# Patient Record
Sex: FEMALE | Race: WHITE | Employment: PART TIME | URBAN - METROPOLITAN AREA
[De-identification: names, ages, dates, MRNs, and addresses within clinical notes are randomized per-mention and may not be internally consistent; named-entity substitution may affect disease eponyms.]

---

## 2018-11-07 LAB
CHLAMYDIA, EXTERNAL: NEGATIVE
HBSAG, EXTERNAL: NEGATIVE
HIV, EXTERNAL: NEGATIVE
N. GONORRHEA, EXTERNAL: NEGATIVE
RPR, EXTERNAL: NON REACTIVE
RUBELLA, EXTERNAL: NORMAL

## 2019-05-14 ENCOUNTER — HOSPITAL ENCOUNTER (OUTPATIENT)
Dept: INFUSION THERAPY | Age: 39
End: 2019-05-14

## 2019-05-16 ENCOUNTER — HOSPITAL ENCOUNTER (OUTPATIENT)
Dept: INFUSION THERAPY | Age: 39
Discharge: HOME | End: 2019-05-16
Payer: COMMERCIAL

## 2019-05-16 VITALS
RESPIRATION RATE: 16 BRPM | HEART RATE: 98 BPM | OXYGEN SATURATION: 99 % | SYSTOLIC BLOOD PRESSURE: 104 MMHG | TEMPERATURE: 98.2 F | DIASTOLIC BLOOD PRESSURE: 68 MMHG

## 2019-05-16 LAB
ABO + RH BLD: NORMAL
BASOPHILS # BLD: 0 K/UL (ref 0–0.1)
BASOPHILS NFR BLD: 1 % (ref 0–2)
BLOOD GROUP ANTIBODIES SERPL: NORMAL
DIFFERENTIAL METHOD BLD: ABNORMAL
EOSINOPHIL # BLD: 0.1 K/UL (ref 0–0.4)
EOSINOPHIL NFR BLD: 1 % (ref 0–5)
ERYTHROCYTE [DISTWIDTH] IN BLOOD BY AUTOMATED COUNT: 14.3 % (ref 11.6–14.5)
GLUCOSE 1H P MEAL SERPL-MCNC: 91 MG/DL (ref 74–106)
HCT VFR BLD AUTO: 29.3 % (ref 35–45)
HGB BLD-MCNC: 9.5 G/DL (ref 12–16)
LYMPHOCYTES # BLD: 1.3 K/UL (ref 0.9–3.6)
LYMPHOCYTES NFR BLD: 20 % (ref 21–52)
MCH RBC QN AUTO: 31.1 PG (ref 24–34)
MCHC RBC AUTO-ENTMCNC: 32.4 G/DL (ref 31–37)
MCV RBC AUTO: 96.1 FL (ref 74–97)
MONOCYTES # BLD: 0.7 K/UL (ref 0.05–1.2)
MONOCYTES NFR BLD: 11 % (ref 3–10)
NEUTS SEG # BLD: 4.3 K/UL (ref 1.8–8)
NEUTS SEG NFR BLD: 67 % (ref 40–73)
PLATELET # BLD AUTO: 176 K/UL (ref 135–420)
PMV BLD AUTO: 9.2 FL (ref 9.2–11.8)
RBC # BLD AUTO: 3.05 M/UL (ref 4.2–5.3)
SPECIMEN EXP DATE BLD: NORMAL
T PALLIDUM AB SER QL IA: NONREACTIVE
WBC # BLD AUTO: 6.3 K/UL (ref 4.6–13.2)

## 2019-05-16 PROCEDURE — 96372 THER/PROPH/DIAG INJ SC/IM: CPT

## 2019-05-16 PROCEDURE — 36415 COLL VENOUS BLD VENIPUNCTURE: CPT

## 2019-05-16 PROCEDURE — 86780 TREPONEMA PALLIDUM: CPT

## 2019-05-16 PROCEDURE — 86900 BLOOD TYPING SEROLOGIC ABO: CPT

## 2019-05-16 PROCEDURE — 82306 VITAMIN D 25 HYDROXY: CPT

## 2019-05-16 PROCEDURE — 85025 COMPLETE CBC W/AUTO DIFF WBC: CPT

## 2019-05-16 PROCEDURE — 82950 GLUCOSE TEST: CPT

## 2019-05-16 PROCEDURE — 74011250636 HC RX REV CODE- 250/636: Performed by: OBSTETRICS & GYNECOLOGY

## 2019-05-16 RX ORDER — DIPHENHYDRAMINE HCL 25 MG
25 CAPSULE ORAL
COMMUNITY

## 2019-05-16 RX ADMIN — HUMAN RHO(D) IMMUNE GLOBULIN 0.3 MG: 300 INJECTION, SOLUTION INTRAMUSCULAR at 10:57

## 2019-05-16 NOTE — PROGRESS NOTES
[facility] Progress Note Date: May 16, 2019 Name: Rosas Sanchez MRN: [id number] : 1980 Ms. Crhis Mauricio arrived to [facility] at [phone] for rhogam injection. Pt states that she has \" had at least 3 in the past\" and denies any reactions or complications from previous injections. Pt also kindly declines care notes. Ms. Chris Mauricio was assessed and education was provided. Consent form signed no questions voiced. Ms. Katie Calabrese vitals were reviewed. Visit Vitals /68 (BP 1 Location: Left arm) Pulse 98 Temp 98.2 °F (36.8 °C) Resp 16 SpO2 99% Breastfeeding? No  
 
 
Recent Results (from the past 12 hour(s)) TYPE & SCREEN Collection Time: 19  9:15 AM  
Result Value Ref Range Crossmatch Expiration 2019 ABO/Rh(D) O NEGATIVE Antibody screen NEG   
RH IMMUNE GLOBULIN PROPHYLACTIC Collection Time: 19  9:15 AM  
Result Value Ref Range No. of weeks gestation 32 CALLED TO: EVERETTE WASHINGTON ON 2019 1044 BY [facility] Unit number [id number] Blood component type RH IMMUNE GLOBULIN Unit division 00 Status of unit ISSUED   
CBC WITH AUTOMATED DIFF Collection Time: 19  9:20 AM  
Result Value Ref Range WBC 6.3 4.6 - 13.2 K/uL  
 RBC 3.05 (L) 4.20 - 5.30 M/uL HGB 9.5 (L) 12.0 - 16.0 g/dL HCT 29.3 (L) 35.0 - 45.0 % MCV 96.1 74.0 - 97.0 FL  
 MCH 31.1 24.0 - 34.0 PG  
 MCHC 32.4 31.0 - 37.0 g/dL  
 RDW 14.3 11.6 - 14.5 % PLATELET 258 410 - 577 K/uL MPV 9.2 9.2 - 11.8 FL  
 NEUTROPHILS 67 40 - 73 % LYMPHOCYTES 20 (L) 21 - 52 % MONOCYTES 11 (H) 3 - 10 % EOSINOPHILS 1 0 - 5 % BASOPHILS 1 0 - 2 %  
 ABS. NEUTROPHILS 4.3 1.8 - 8.0 K/UL  
 ABS. LYMPHOCYTES 1.3 0.9 - 3.6 K/UL  
 ABS. MONOCYTES 0.7 0.05 - 1.2 K/UL  
 ABS. EOSINOPHILS 0.1 0.0 - 0.4 K/UL  
 ABS. BASOPHILS 0.0 0.0 - 0.1 K/UL  
 DF AUTOMATED    
GLUCOSE, 1 HR PP Collection Time: 19 10:20 AM  
Result Value Ref Range Glucose, 1 hr PP 91 74 - 106 MG/DL  
 
 
 
 
300 mcg Rhogam  was administered as ordered IM in patient's Right deltoid after verification from 2nd nurse, covered with bandaid Ms. Garcia tolerated well without complaints. Ms. Ramona aFll was discharged from [place] [address] in stable condition at 1135  She is to return only as referred. Massimo Galloway RN May 16, 2019

## 2019-05-17 LAB
BLD PROD TYP BPU: NORMAL
BPU ID: NORMAL
CALLED TO:,BCALL1: NORMAL
GA (WEEKS): 27 WK
STATUS OF UNIT,%ST: NORMAL
UNIT DIVISION, %UDIV: 0

## 2019-05-23 LAB
25(OH)D2 SERPL-MCNC: <1 NG/ML
25(OH)D3 SERPL-MCNC: 36 NG/ML
25(OH)D3+25(OH)D2 SERPL-MCNC: 36 NG/ML

## 2019-08-02 NOTE — H&P
[facility]  HISTORY AND PHYSICAL    Name:  Lucas Banks  MR#:   [id number]  :  1980  ACCOUNT #:  [de-identified]  ADMIT DATE:  2019    ADMISSION DIAGNOSES:  Advanced maternal age, term pregnancy at 44 plus 3 weeks' gestation, transverse lie, for repeat  section. HISTORY OF PRESENT ILLNESS:  The patient is a 49-year-old  3, para 2, aborta 0  female. Workup for advanced maternal age revealed no abnormality seen except there were two fibroids noted on the ultrasound and an anterior fundal placenta. The baby was in the 80 percentile. The patient is now transverse lie, back is neutral as the ultrasound on 2019. The patient is currently in for her third  section. The patient will be on Ancef prophylaxis. The patient did have some issues which did prompt a PIH panel with a 24-hour urine revealing 485 mg per 24 hours with her studies having just completed this past week. There is no proteinuria today and blood pressure is running 127/79. PIH counts were also all normal at that time. Currently, the patient is having some generalized headache, primarily temporal right and then all over, although it is not a sinus headache or what appears to be toxemia. The patient notably had a concussion which was worked up in 2019 by Neurology and was felt to be benign and no MRI was done. The headache is somewhat short acting. It does not seem to be positional.    PAST MEDICAL HISTORY:  Previous history in  and  of . The patient has had a benign arrhythmia workup with EKG during this pregnancy. EKG was otherwise normal.  The patient also has had clearance of her Pap smear which is now class I.  Had a benign breast mass in . ALLERGIES:  NO KNOWN DRUG ALLERGIES. SOCIAL HISTORY:  Spouse's name is Ty. FAMILY HISTORY:  The patient's mother did have colon cancer. The patient also had a grandmother with colon cancer.     The patient's alpha-fetoprotein 4 and cystic fibrosis testing were both normal at 15 weeks. Thyroid function tests were all normal.  The patient's one-hour postprandial blood sugars were all normal at 30 weeks. The patient's hemoglobin was slightly low at 9.5 at or around 36 weeks. The patient's blood type is O negative. She did receive prophylactic RhoGAM on 05/16/2019. Pap smear is class I.  RPR nonreactive. GC and CT cultures are negative. Rubella immune. Hepatitis and AIDS screens are negative. Vitamin D level was listed as 33 during the pregnancy. REVIEW OF SYSTEMS:  System review is otherwise noncontributory and/or normal except for given above. PHYSICAL EXAMINATION:  GENERAL:  A well-developed, well-nourished  female. VITAL SIGNS:  5 feet 4 inches tall. Weight 157 pounds. Blood pressure 127/79. HEAD, EYES, EARS, NOSE, AND THROAT:  Normal.  CHEST:  Clear. BREASTS:  Without extraneous masses. CARDIAC:  Normal.  ABDOMEN:  Reveals an estimated fetal weight about 8 to 8-1/2 pounds. Transverse lie by ultrasound. Vertex near the left upper quadrant and has an anterior grade II placenta which does come down with the upper two-thirds of the anterior fundus. Amniotic fluid index is approximately 8.   Rest of the exam including neurologic is otherwise normal.  Reflexes are normal.  Duo doppler us legs, 8/5/19 = wnl no evidence dvt, mi er. 5 pm    Mary Self MD      [id number]  D:  07/30/2019 13:29  T:  07/30/2019 15:24  JOB #:  [id number]  CC:  Labor And  Delivery

## 2019-08-05 ENCOUNTER — APPOINTMENT (OUTPATIENT)
Dept: VASCULAR SURGERY | Age: 39
End: 2019-08-05
Attending: EMERGENCY MEDICINE
Payer: COMMERCIAL

## 2019-08-05 ENCOUNTER — HOSPITAL ENCOUNTER (EMERGENCY)
Age: 39
Discharge: HOME | End: 2019-08-05
Attending: EMERGENCY MEDICINE
Payer: COMMERCIAL

## 2019-08-05 ENCOUNTER — HOSPITAL ENCOUNTER (OUTPATIENT)
Dept: PREADMISSION TESTING | Age: 39
Discharge: HOME | End: 2019-08-05
Payer: COMMERCIAL

## 2019-08-05 VITALS
TEMPERATURE: 98.1 F | SYSTOLIC BLOOD PRESSURE: 119 MMHG | HEIGHT: 65 IN | WEIGHT: 160 LBS | DIASTOLIC BLOOD PRESSURE: 83 MMHG | RESPIRATION RATE: 18 BRPM | BODY MASS INDEX: 26.66 KG/M2 | HEART RATE: 93 BPM | OXYGEN SATURATION: 100 %

## 2019-08-05 DIAGNOSIS — M25.562 ARTHRALGIA OF LEFT LOWER LEG: Primary | ICD-10-CM

## 2019-08-05 LAB
ABO + RH BLD: NORMAL
ALBUMIN SERPL-MCNC: 2.9 G/DL (ref 3.4–5)
ALBUMIN/GLOB SERPL: 0.9 {RATIO} (ref 0.8–1.7)
ALP SERPL-CCNC: 149 U/L (ref 45–117)
ALT SERPL-CCNC: 19 U/L (ref 13–56)
ANION GAP SERPL CALC-SCNC: 8 MMOL/L (ref 3–18)
APPEARANCE UR: CLEAR
AST SERPL-CCNC: 19 U/L (ref 10–38)
BILIRUB SERPL-MCNC: 0.5 MG/DL (ref 0.2–1)
BILIRUB UR QL: NEGATIVE
BLOOD GROUP ANTIBODIES SERPL: NORMAL
BLOOD GROUP ANTIBODIES SERPL: NORMAL
BUN SERPL-MCNC: 7 MG/DL (ref 7–18)
BUN/CREAT SERPL: 14 (ref 12–20)
CALCIUM SERPL-MCNC: 8.8 MG/DL (ref 8.5–10.1)
CHLORIDE SERPL-SCNC: 110 MMOL/L (ref 100–111)
CO2 SERPL-SCNC: 25 MMOL/L (ref 21–32)
COLOR UR: YELLOW
CREAT SERPL-MCNC: 0.5 MG/DL (ref 0.6–1.3)
ERYTHROCYTE [DISTWIDTH] IN BLOOD BY AUTOMATED COUNT: 13.8 % (ref 11.6–14.5)
GLOBULIN SER CALC-MCNC: 3.3 G/DL (ref 2–4)
GLUCOSE SERPL-MCNC: 79 MG/DL (ref 74–99)
GLUCOSE UR STRIP.AUTO-MCNC: NEGATIVE MG/DL
HCT VFR BLD AUTO: 36.7 % (ref 35–45)
HGB BLD-MCNC: 11.9 G/DL (ref 12–16)
HGB UR QL STRIP: NEGATIVE
KETONES UR QL STRIP.AUTO: NEGATIVE MG/DL
LEUKOCYTE ESTERASE UR QL STRIP.AUTO: NEGATIVE
MCH RBC QN AUTO: 31.6 PG (ref 24–34)
MCHC RBC AUTO-ENTMCNC: 32.4 G/DL (ref 31–37)
MCV RBC AUTO: 97.6 FL (ref 74–97)
NITRITE UR QL STRIP.AUTO: NEGATIVE
PH UR STRIP: 8 [PH] (ref 5–8)
PLATELET # BLD AUTO: 149 K/UL (ref 135–420)
PMV BLD AUTO: 10.8 FL (ref 9.2–11.8)
POTASSIUM SERPL-SCNC: 3.9 MMOL/L (ref 3.5–5.5)
PROT SERPL-MCNC: 6.2 G/DL (ref 6.4–8.2)
PROT UR STRIP-MCNC: NEGATIVE MG/DL
RBC # BLD AUTO: 3.76 M/UL (ref 4.2–5.3)
RPR SER QL: NONREACTIVE
SODIUM SERPL-SCNC: 143 MMOL/L (ref 136–145)
SP GR UR REFRACTOMETRY: 1.01 (ref 1–1.03)
SPECIMEN EXP DATE BLD: NORMAL
UROBILINOGEN UR QL STRIP.AUTO: 0.2 EU/DL (ref 0.2–1)
WBC # BLD AUTO: 5.6 K/UL (ref 4.6–13.2)

## 2019-08-05 PROCEDURE — 99283 EMERGENCY DEPT VISIT LOW MDM: CPT

## 2019-08-05 PROCEDURE — 93971 EXTREMITY STUDY: CPT

## 2019-08-05 PROCEDURE — 81003 URINALYSIS AUTO W/O SCOPE: CPT

## 2019-08-05 PROCEDURE — 86900 BLOOD TYPING SEROLOGIC ABO: CPT

## 2019-08-05 PROCEDURE — 80053 COMPREHEN METABOLIC PANEL: CPT

## 2019-08-05 PROCEDURE — 86870 RBC ANTIBODY IDENTIFICATION: CPT

## 2019-08-05 PROCEDURE — 86592 SYPHILIS TEST NON-TREP QUAL: CPT

## 2019-08-05 PROCEDURE — 36415 COLL VENOUS BLD VENIPUNCTURE: CPT

## 2019-08-05 PROCEDURE — 85027 COMPLETE CBC AUTOMATED: CPT

## 2019-08-05 NOTE — ED PROVIDER NOTES
EMERGENCY DEPARTMENT HISTORY AND PHYSICAL EXAM    Date: 2019  Patient Name: Ramana Paredes    History of Presenting Illness     Chief Complaint   Patient presents with    Leg Pain         History Provided By: Patient    Chief Complaint: leg pain       Additional History (Context):   6:49 PM  Ramana Paredes is a 45 y.o. female 44 weeks pregnant presents to the emergency department C/O left leg pain x a week. No injury to the area. Patient denies any chest pain or shortness of breath. She is scheduled for  next week. Sent by Dr. Nereyda Arellano to rule out DVT     PCP: Catia Leon MD    Current Outpatient Medications   Medication Sig Dispense Refill    diphenhydrAMINE (BENADRYL) 25 mg capsule Take 25 mg by mouth nightly as needed.  prenatal vit-fe fum-fa-dss (PRENATAL 19) 29-1 mg Tab Take 1 Tab by mouth daily. Past History     Past Medical History:  Past Medical History:   Diagnosis Date    Unspecified breast disorder     breast biopsy in  for cyst, benign. Past Surgical History:  Past Surgical History:   Procedure Laterality Date     DELIVERY ONLY             Family History:  History reviewed. No pertinent family history. Social History:  Social History     Tobacco Use    Smoking status: Never Smoker    Smokeless tobacco: Never Used   Substance Use Topics    Alcohol use: No    Drug use: No       Allergies:  No Known Allergies    Review of Systems   Review of Systems   Constitutional: Negative for chills and fever. Respiratory: Negative for shortness of breath. Cardiovascular: Negative for chest pain. Musculoskeletal: Positive for myalgias (left leg). Skin: Negative for color change, rash and wound. Neurological: Negative for weakness and numbness. All other systems reviewed and are negative.       Physical Exam     Vitals:    19 1832   BP: 125/70   Pulse: 93   Resp: 18   Temp: 98.8 °F (37.1 °C)   SpO2: 100%   Weight: 72.6 kg (160 lb) Height: 5' 5\" (1.651 m)     Physical Exam   Constitutional: She is oriented to person, place, and time. She appears well-developed and well-nourished. Alert well-appearing nontoxic no acute distress   HENT:   Head: Normocephalic and atraumatic. Neck: Normal range of motion. Neck supple. Cardiovascular: Normal rate, regular rhythm, normal heart sounds and intact distal pulses. No murmur heard. Pulmonary/Chest: Effort normal and breath sounds normal. No respiratory distress. She has no wheezes. She has no rales. Abdominal: Soft. Bowel sounds are normal. There is no tenderness. Gravid nontender abdomen   Musculoskeletal:        Legs:  Neurological: She is alert and oriented to person, place, and time. Psychiatric: She has a normal mood and affect. Judgment normal.   Nursing note and vitals reviewed. Diagnostic Study Results     Labs:     Recent Results (from the past 12 hour(s))   CBC W/O DIFF    Collection Time: 08/05/19 12:55 PM   Result Value Ref Range    WBC 5.6 4.6 - 13.2 K/uL    RBC 3.76 (L) 4.20 - 5.30 M/uL    HGB 11.9 (L) 12.0 - 16.0 g/dL    HCT 36.7 35.0 - 45.0 %    MCV 97.6 (H) 74.0 - 97.0 FL    MCH 31.6 24.0 - 34.0 PG    MCHC 32.4 31.0 - 37.0 g/dL    RDW 13.8 11.6 - 14.5 %    PLATELET 199 874 - 629 K/uL    MPV 10.8 9.2 - 70.6 FL   METABOLIC PANEL, COMPREHENSIVE    Collection Time: 08/05/19 12:55 PM   Result Value Ref Range    Sodium 143 136 - 145 mmol/L    Potassium 3.9 3.5 - 5.5 mmol/L    Chloride 110 100 - 111 mmol/L    CO2 25 21 - 32 mmol/L    Anion gap 8 3.0 - 18 mmol/L    Glucose 79 74 - 99 mg/dL    BUN 7 7.0 - 18 MG/DL    Creatinine 0.50 (L) 0.6 - 1.3 MG/DL    BUN/Creatinine ratio 14 12 - 20      GFR est AA >60 >60 ml/min/1.73m2    GFR est non-AA >60 >60 ml/min/1.73m2    Calcium 8.8 8.5 - 10.1 MG/DL    Bilirubin, total 0.5 0.2 - 1.0 MG/DL    ALT (SGPT) 19 13 - 56 U/L    AST (SGOT) 19 10 - 38 U/L    Alk.  phosphatase 149 (H) 45 - 117 U/L    Protein, total 6.2 (L) 6.4 - 8.2 g/dL Albumin 2.9 (L) 3.4 - 5.0 g/dL    Globulin 3.3 2.0 - 4.0 g/dL    A-G Ratio 0.9 0.8 - 1.7     RPR    Collection Time: 08/05/19 12:55 PM   Result Value Ref Range    RPR NONREACTIVE NR     URINALYSIS W/ RFLX MICROSCOPIC    Collection Time: 08/05/19 12:55 PM   Result Value Ref Range    Color YELLOW      Appearance CLEAR      Specific gravity 1.008 1.005 - 1.030      pH (UA) 8.0 5.0 - 8.0      Protein NEGATIVE  NEG mg/dL    Glucose NEGATIVE  NEG mg/dL    Ketone NEGATIVE  NEG mg/dL    Bilirubin NEGATIVE  NEG      Blood NEGATIVE  NEG      Urobilinogen 0.2 0.2 - 1.0 EU/dL    Nitrites NEGATIVE  NEG      Leukocyte Esterase NEGATIVE  NEG     TYPE & SCREEN    Collection Time: 08/05/19 12:55 PM   Result Value Ref Range    Crossmatch Expiration 08/08/2019     ABO/Rh(D) O NEGATIVE     Antibody screen POS     Antibody ID anti-D, recent RHIG injection        Radiologic Studies:   No orders to display     CT Results  (Last 48 hours)    None        CXR Results  (Last 48 hours)    None          PVL LLE:   · No evidence of deep vein thrombosis in the left lower extremity veins assessed and contralateral common femoral vein. As read by Cloud [employer]    Medical Decision Making   I am the first provider for this patient. I reviewed the vital signs, available nursing notes, past medical history, past surgical history, family history and social history. Vital Signs: Reviewed the patient's vital signs. Pulse Oximetry Analysis: 100% on RA       Records Reviewed: Nursing Notes and Old Medical Records    Procedures:  Procedures    ED Course:   6:49 PM Initial assessment performed. The patients presenting problems have been discussed, and they are in agreement with the care plan formulated and outlined with them. I have encouraged them to ask questions as they arise throughout their visit. Discussion:  Pt presents with left lower leg pain sent by her OB/GYN to rule out DVT. PVL study negative for DVT.   No evidence of cellulitis. Will have patient continue to take Tylenol for pain. Strict return precautions given, pt offering no questions or complaints. Diagnosis and Disposition     DISCHARGE NOTE:  Alexandria Garcia's  results have been reviewed with her. She has been counseled regarding her diagnosis, treatment, and plan. She verbally conveys understanding and agreement of the signs, symptoms, diagnosis, treatment and prognosis and additionally agrees to follow up as discussed. She also agrees with the care-plan and conveys that all of her questions have been answered. I have also provided discharge instructions for her that include: educational information regarding their diagnosis and treatment, and list of reasons why they would want to return to the ED prior to their follow-up appointment, should her condition change. She has been provided with education for proper emergency department utilization. CLINICAL IMPRESSION:    1. Arthralgia of left lower leg        PLAN:  1. D/C Home  2. Current Discharge Medication List        3. Follow-up Information     Follow up With Specialties Details Why William Cesar MD Obstetrics & Gynecology, Gynecology, Obstetrics  call for follow up and recheck  [facility]      [facility] EMERGENCY DEPT Emergency Medicine  If symptoms worsen [address] [place] [address] [phone]                 Please note that this dictation was completed with [employer], the computer voice recognition software. Quite often unanticipated grammatical, syntax, homophones, and other interpretive errors are inadvertently transcribed by the computer software. Please disregard these errors. Please excuse any errors that have escaped final proofreading.

## 2019-08-06 ENCOUNTER — ANESTHESIA EVENT (OUTPATIENT)
Dept: LABOR AND DELIVERY | Age: 39
End: 2019-08-06
Payer: COMMERCIAL

## 2019-08-06 PROBLEM — Z3A.39 39 WEEKS GESTATION OF PREGNANCY: Status: ACTIVE | Noted: 2019-08-06

## 2019-08-06 PROBLEM — O34.219 DELIVERY WITH HISTORY OF CESAREAN SECTION: Status: ACTIVE | Noted: 2019-08-06

## 2019-08-06 PROBLEM — O09.523 AMA (ADVANCED MATERNAL AGE) MULTIGRAVIDA 35+, THIRD TRIMESTER: Status: ACTIVE | Noted: 2019-08-06

## 2019-08-06 NOTE — ED NOTES
Discharge instructions and medications reviewed with pt, pt in stable condition, pt verbalizes understanding

## 2019-08-06 NOTE — DISCHARGE INSTRUCTIONS
Patient Education         Section: What to Expect at Home  Your Recovery    A  section, or , is surgery to deliver your baby through a cut, called an incision, that the doctor makes in your lower belly and uterus. You may have some pain in your lower belly and need pain medicine for 1 to 2 weeks. You can expect some vaginal bleeding for several weeks. You will probably need about 6 weeks to fully recover. It is important to take it easy while the incision is healing. Avoid heavy lifting, strenuous activities, or exercises that strain the belly muscles while you are recovering. Ask a family member or friend for help with housework, cooking, and shopping. This care sheet gives you a general idea about how long it will take for you to recover. But each person recovers at a different pace. Follow the steps below to get better as quickly as possible. How can you care for yourself at home? Activity    · Rest when you feel tired. Getting enough sleep will help you recover.     · Try to walk each day. Start by walking a little more than you did the day before. Bit by bit, increase the amount you walk. Walking boosts blood flow and helps prevent pneumonia, constipation, and blood clots.     · Avoid strenuous activities, such as bicycle riding, jogging, weightlifting, and aerobic exercise, for 6 weeks or until your doctor says it is okay.     · Until your doctor says it is okay, do not lift anything heavier than your baby.     · Do not do sit-ups or other exercises that strain the belly muscles for 6 weeks or until your doctor says it is okay.     · Hold a pillow over your incision when you cough or take deep breaths. This will support your belly and decrease your pain.     · You may shower as usual. Pat the incision dry when you are done.     · You will have some vaginal bleeding. Wear sanitary pads.  Do not douche or use tampons until your doctor says it is okay.     · Ask your doctor when you can drive again.     · You will probably need to take at least 6 weeks off work. It depends on the type of work you do and how you feel.     · Ask your doctor when it is okay for you to have sex. Diet    · You can eat your normal diet. If your stomach is upset, try bland, low-fat foods like plain rice, broiled chicken, toast, and yogurt.     · Drink plenty of fluids (unless your doctor tells you not to).     · You may notice that your bowel movements are not regular right after your surgery. This is common. Try to avoid constipation and straining with bowel movements. You may want to take a fiber supplement every day. If you have not had a bowel movement after a couple of days, ask your doctor about taking a mild laxative.     · If you are breastfeeding, limit alcohol. Alcohol can cause a lack of energy and other health problems for the baby when a breastfeeding woman drinks heavily. It can also get in the way of a mom's ability to feed her baby or to care for the child in other ways. There isn't a lot of research about exactly how much alcohol can harm a baby. Having no alcohol is the safest choice for your baby. If you choose to have a drink now and then, have only one drink, and limit the number of occasions that you have a drink. Wait to breastfeed at least 2 hours after you have a drink to reduce the amount of alcohol the baby may get in the milk. Medicines    · Your doctor will tell you if and when you can restart your medicines. He or she will also give you instructions about taking any new medicines.     · If you take blood thinners, such as warfarin (Coumadin), clopidogrel (Plavix), or aspirin, be sure to talk to your doctor. He or she will tell you if and when to start taking those medicines again. Make sure that you understand exactly what your doctor wants you to do.     · Take pain medicines exactly as directed. ? If the doctor gave you a prescription medicine for pain, take it as prescribed. ?  If you are not taking a prescription pain medicine, ask your doctor if you can take an over-the-counter medicine.     · If you think your pain medicine is making you sick to your stomach:  ? Take your medicine after meals (unless your doctor has told you not to). ? Ask your doctor for a different pain medicine.     · If your doctor prescribed antibiotics, take them as directed. Do not stop taking them just because you feel better. You need to take the full course of antibiotics. Incision care    · If you have strips of tape on the incision, leave the tape on for a week or until it falls off.     · Wash the area daily with warm, soapy water, and pat it dry. Don't use hydrogen peroxide or alcohol, which can slow healing. You may cover the area with a gauze bandage if it weeps or rubs against clothing. Change the bandage every day.     · Keep the area clean and dry. Other instructions    · If you breastfeed your baby, you may be more comfortable while you are healing if you place the baby so that he or she is not resting on your belly. Try tucking your baby under your arm, with his or her body along the side you will be feeding on. Support your baby's upper body with your arm. With that hand you can control your baby's head to bring his or her mouth to your breast. This is sometimes called the football hold. Follow-up care is a key part of your treatment and safety. Be sure to make and go to all appointments, and call your doctor if you are having problems. It's also a good idea to know your test results and keep a list of the medicines you take. When should you call for help? [id number] anytime you think you may need emergency care.  For example, call if:    · You have thoughts of harming yourself, your baby, or another person.     · You passed out (lost consciousness).     · You have chest pain, are short of breath, or cough up blood.     · You have a seizure.    Call your doctor now or seek immediate medical care if:    · You have pain that does not get better after you take pain medicine.     · You have severe vaginal bleeding.     · You are dizzy or lightheaded, or you feel like you may faint.     · You have new or worse pain in your belly or pelvis.     · You have loose stitches, or your incision comes open.     · You have symptoms of infection, such as:  ? Increased pain, swelling, warmth, or redness. ? Red streaks leading from the incision. ? Pus draining from the incision. ? A fever.     · You have symptoms of a blood clot in your leg (called a deep vein thrombosis), such as:  ? Pain in your calf, back of the knee, thigh, or groin. ? Redness and swelling in your leg or groin.     · You have signs of preeclampsia, such as:  ? Sudden swelling of your face, hands, or feet. ? New vision problems (such as dimness, blurring, or seeing spots). ? A severe headache.    Watch closely for changes in your health, and be sure to contact your doctor if:    · You do not get better as expected. Where can you learn more? Go to http://chapo-nishant.info/. Enter M806 in the search box to learn more about \" Section: What to Expect at Home. \"  Current as of: 2018  Content Version: 12.1  © 9896-7312 [employer]. Care instructions adapted under license by [employer] (which disclaims liability or warranty for this information). If you have questions about a medical condition or this instruction, always ask your healthcare professional. [place] [address] any warranty or liability for your use of this information.

## 2019-08-07 ENCOUNTER — ANESTHESIA (OUTPATIENT)
Dept: LABOR AND DELIVERY | Age: 39
End: 2019-08-07
Payer: COMMERCIAL

## 2019-08-07 ENCOUNTER — HOSPITAL ENCOUNTER (INPATIENT)
Age: 39
LOS: 2 days | Discharge: HOME | End: 2019-08-09
Attending: OBSTETRICS & GYNECOLOGY | Admitting: OBSTETRICS & GYNECOLOGY
Payer: COMMERCIAL

## 2019-08-07 PROBLEM — Z3A.39 39 WEEKS GESTATION OF PREGNANCY: Status: RESOLVED | Noted: 2019-08-06 | Resolved: 2019-08-07

## 2019-08-07 PROBLEM — O09.523 AMA (ADVANCED MATERNAL AGE) MULTIGRAVIDA 35+, THIRD TRIMESTER: Status: RESOLVED | Noted: 2019-08-06 | Resolved: 2019-08-07

## 2019-08-07 PROBLEM — O34.219 DELIVERY WITH HISTORY OF CESAREAN SECTION: Status: RESOLVED | Noted: 2019-08-06 | Resolved: 2019-08-07

## 2019-08-07 LAB
HCT VFR BLD AUTO: 34.3 % (ref 35–45)
HGB BLD-MCNC: 11.4 G/DL (ref 12–16)

## 2019-08-07 PROCEDURE — 74011250636 HC RX REV CODE- 250/636: Performed by: ANESTHESIOLOGY

## 2019-08-07 PROCEDURE — 74011250636 HC RX REV CODE- 250/636: Performed by: OBSTETRICS & GYNECOLOGY

## 2019-08-07 PROCEDURE — 76010000391 HC C SECN FIRST 1 HR: Performed by: OBSTETRICS & GYNECOLOGY

## 2019-08-07 PROCEDURE — 77030040361 HC SLV COMPR DVT MDII -B: Performed by: OBSTETRICS & GYNECOLOGY

## 2019-08-07 PROCEDURE — 74011250636 HC RX REV CODE- 250/636

## 2019-08-07 PROCEDURE — 85014 HEMATOCRIT: CPT

## 2019-08-07 PROCEDURE — 74011250637 HC RX REV CODE- 250/637: Performed by: OBSTETRICS & GYNECOLOGY

## 2019-08-07 PROCEDURE — 77030002888 HC SUT CHRMC J&J -A: Performed by: OBSTETRICS & GYNECOLOGY

## 2019-08-07 PROCEDURE — 77030014144 HC TY SPN ANES BBMI -B: Performed by: ANESTHESIOLOGY

## 2019-08-07 PROCEDURE — 85018 HEMOGLOBIN: CPT

## 2019-08-07 PROCEDURE — 36415 COLL VENOUS BLD VENIPUNCTURE: CPT

## 2019-08-07 PROCEDURE — 75410000003 HC RECOV DEL/VAG/CSECN EA 0.5 HR: Performed by: OBSTETRICS & GYNECOLOGY

## 2019-08-07 PROCEDURE — 88307 TISSUE EXAM BY PATHOLOGIST: CPT

## 2019-08-07 PROCEDURE — 74011000250 HC RX REV CODE- 250

## 2019-08-07 PROCEDURE — 76060000034 HC ANESTHESIA 1.5 TO 2 HR: Performed by: OBSTETRICS & GYNECOLOGY

## 2019-08-07 PROCEDURE — 65270000029 HC RM PRIVATE

## 2019-08-07 PROCEDURE — 59025 FETAL NON-STRESS TEST: CPT

## 2019-08-07 PROCEDURE — 75410000003 HC RECOV DEL/VAG/CSECN EA 0.5 HR

## 2019-08-07 PROCEDURE — 76010000392 HC C SECN EA ADDL 0.5 HR: Performed by: OBSTETRICS & GYNECOLOGY

## 2019-08-07 PROCEDURE — 77030002933 HC SUT MCRYL J&J -A: Performed by: OBSTETRICS & GYNECOLOGY

## 2019-08-07 PROCEDURE — 77030031139 HC SUT VCRL2 J&J -A: Performed by: OBSTETRICS & GYNECOLOGY

## 2019-08-07 RX ORDER — ACETAMINOPHEN 325 MG/1
650 TABLET ORAL
Status: DISCONTINUED | OUTPATIENT
Start: 2019-08-07 | End: 2019-08-09 | Stop reason: HOSPADM

## 2019-08-07 RX ORDER — KETAMINE HYDROCHLORIDE 10 MG/ML
INJECTION, SOLUTION INTRAMUSCULAR; INTRAVENOUS AS NEEDED
Status: DISCONTINUED | OUTPATIENT
Start: 2019-08-07 | End: 2019-08-07 | Stop reason: HOSPADM

## 2019-08-07 RX ORDER — METHYLERGONOVINE MALEATE 0.2 MG/ML
INJECTION INTRAVENOUS AS NEEDED
Status: DISCONTINUED | OUTPATIENT
Start: 2019-08-07 | End: 2019-08-07 | Stop reason: HOSPADM

## 2019-08-07 RX ORDER — IBUPROFEN 400 MG/1
800 TABLET ORAL
Status: DISCONTINUED | OUTPATIENT
Start: 2019-08-10 | End: 2019-08-08 | Stop reason: RX

## 2019-08-07 RX ORDER — CEFAZOLIN SODIUM/WATER 2 G/20 ML
2 SYRINGE (ML) INTRAVENOUS EVERY 6 HOURS
Status: DISCONTINUED | OUTPATIENT
Start: 2019-08-07 | End: 2019-08-07 | Stop reason: HOSPADM

## 2019-08-07 RX ORDER — DIPHENHYDRAMINE HCL 25 MG
25 CAPSULE ORAL
Status: ACTIVE | OUTPATIENT
Start: 2019-08-07 | End: 2019-08-08

## 2019-08-07 RX ORDER — ONDANSETRON 2 MG/ML
INJECTION INTRAMUSCULAR; INTRAVENOUS AS NEEDED
Status: DISCONTINUED | OUTPATIENT
Start: 2019-08-07 | End: 2019-08-07 | Stop reason: HOSPADM

## 2019-08-07 RX ORDER — SODIUM CHLORIDE 0.9 % (FLUSH) 0.9 %
5-40 SYRINGE (ML) INJECTION AS NEEDED
Status: DISCONTINUED | OUTPATIENT
Start: 2019-08-07 | End: 2019-08-09 | Stop reason: HOSPADM

## 2019-08-07 RX ORDER — TRISODIUM CITRATE DIHYDRATE AND CITRIC ACID MONOHYDRATE 500; 334 MG/5ML; MG/5ML
30 SOLUTION ORAL ONCE
Status: DISCONTINUED | OUTPATIENT
Start: 2019-08-07 | End: 2019-08-07 | Stop reason: HOSPADM

## 2019-08-07 RX ORDER — DIPHENHYDRAMINE HYDROCHLORIDE 50 MG/ML
12.5 INJECTION, SOLUTION INTRAMUSCULAR; INTRAVENOUS
Status: DISCONTINUED | OUTPATIENT
Start: 2019-08-07 | End: 2019-08-09 | Stop reason: HOSPADM

## 2019-08-07 RX ORDER — KETOROLAC TROMETHAMINE 30 MG/ML
30 INJECTION, SOLUTION INTRAMUSCULAR; INTRAVENOUS EVERY 6 HOURS
Status: DISCONTINUED | OUTPATIENT
Start: 2019-08-07 | End: 2019-08-08

## 2019-08-07 RX ORDER — SODIUM CHLORIDE, SODIUM LACTATE, POTASSIUM CHLORIDE, CALCIUM CHLORIDE 600; 310; 30; 20 MG/100ML; MG/100ML; MG/100ML; MG/100ML
125 INJECTION, SOLUTION INTRAVENOUS CONTINUOUS
Status: DISCONTINUED | OUTPATIENT
Start: 2019-08-07 | End: 2019-08-07

## 2019-08-07 RX ORDER — OXYTOCIN/RINGER'S LACTATE 20/1000 ML
PLASTIC BAG, INJECTION (ML) INTRAVENOUS AS NEEDED
Status: DISCONTINUED | OUTPATIENT
Start: 2019-08-07 | End: 2019-08-07 | Stop reason: HOSPADM

## 2019-08-07 RX ORDER — PHYTONADIONE 1 MG/.5ML
1 INJECTION, EMULSION INTRAMUSCULAR; INTRAVENOUS; SUBCUTANEOUS ONCE
Status: DISCONTINUED | OUTPATIENT
Start: 2019-08-07 | End: 2019-08-07 | Stop reason: CLARIF

## 2019-08-07 RX ORDER — MISOPROSTOL 100 UG/1
1000 TABLET ORAL
Status: DISCONTINUED | OUTPATIENT
Start: 2019-08-07 | End: 2019-08-07

## 2019-08-07 RX ORDER — PROMETHAZINE HYDROCHLORIDE 25 MG/ML
25 INJECTION, SOLUTION INTRAMUSCULAR; INTRAVENOUS
Status: DISCONTINUED | OUTPATIENT
Start: 2019-08-07 | End: 2019-08-09 | Stop reason: HOSPADM

## 2019-08-07 RX ORDER — NALBUPHINE HYDROCHLORIDE 10 MG/ML
5 INJECTION, SOLUTION INTRAMUSCULAR; INTRAVENOUS; SUBCUTANEOUS
Status: DISCONTINUED | OUTPATIENT
Start: 2019-08-07 | End: 2019-08-09 | Stop reason: HOSPADM

## 2019-08-07 RX ORDER — SIMETHICONE 80 MG
80 TABLET,CHEWABLE ORAL
Status: DISCONTINUED | OUTPATIENT
Start: 2019-08-07 | End: 2019-08-09 | Stop reason: HOSPADM

## 2019-08-07 RX ORDER — NALOXONE HYDROCHLORIDE 0.4 MG/ML
0.4 INJECTION, SOLUTION INTRAMUSCULAR; INTRAVENOUS; SUBCUTANEOUS
Status: ACTIVE | OUTPATIENT
Start: 2019-08-07 | End: 2019-08-08

## 2019-08-07 RX ORDER — OXYCODONE HYDROCHLORIDE 5 MG/1
10 TABLET ORAL
Status: ACTIVE | OUTPATIENT
Start: 2019-08-07 | End: 2019-08-08

## 2019-08-07 RX ORDER — SODIUM CHLORIDE 0.9 % (FLUSH) 0.9 %
5-40 SYRINGE (ML) INJECTION EVERY 8 HOURS
Status: DISCONTINUED | OUTPATIENT
Start: 2019-08-07 | End: 2019-08-09 | Stop reason: HOSPADM

## 2019-08-07 RX ORDER — MISOPROSTOL 100 UG/1
800 TABLET ORAL
Status: COMPLETED | OUTPATIENT
Start: 2019-08-07 | End: 2019-08-07

## 2019-08-07 RX ORDER — OXYTOCIN/0.9 % SODIUM CHLORIDE 20/1000 ML
PLASTIC BAG, INJECTION (ML) INTRAVENOUS
Status: COMPLETED
Start: 2019-08-07 | End: 2019-08-07

## 2019-08-07 RX ORDER — FENTANYL CITRATE 50 UG/ML
INJECTION, SOLUTION INTRAMUSCULAR; INTRAVENOUS AS NEEDED
Status: DISCONTINUED | OUTPATIENT
Start: 2019-08-07 | End: 2019-08-07 | Stop reason: HOSPADM

## 2019-08-07 RX ORDER — ZOLPIDEM TARTRATE 5 MG/1
5 TABLET ORAL
Status: DISCONTINUED | OUTPATIENT
Start: 2019-08-07 | End: 2019-08-09 | Stop reason: HOSPADM

## 2019-08-07 RX ORDER — OXYCODONE AND ACETAMINOPHEN 5; 325 MG/1; MG/1
1-2 TABLET ORAL
Status: DISCONTINUED | OUTPATIENT
Start: 2019-08-07 | End: 2019-08-09 | Stop reason: HOSPADM

## 2019-08-07 RX ORDER — MORPHINE SULFATE 1 MG/ML
150 INJECTION, SOLUTION EPIDURAL; INTRATHECAL; INTRAVENOUS ONCE
Status: DISCONTINUED | OUTPATIENT
Start: 2019-08-07 | End: 2019-08-07 | Stop reason: HOSPADM

## 2019-08-07 RX ORDER — HYDROMORPHONE HYDROCHLORIDE 1 MG/ML
1 INJECTION, SOLUTION INTRAMUSCULAR; INTRAVENOUS; SUBCUTANEOUS ONCE
Status: COMPLETED | OUTPATIENT
Start: 2019-08-07 | End: 2019-08-07

## 2019-08-07 RX ORDER — OXYTOCIN/0.9 % SODIUM CHLORIDE 20/1000 ML
125 PLASTIC BAG, INJECTION (ML) INTRAVENOUS CONTINUOUS
Status: DISCONTINUED | OUTPATIENT
Start: 2019-08-07 | End: 2019-08-09 | Stop reason: HOSPADM

## 2019-08-07 RX ORDER — KETOROLAC TROMETHAMINE 30 MG/ML
30 INJECTION, SOLUTION INTRAMUSCULAR; INTRAVENOUS
Status: ACTIVE | OUTPATIENT
Start: 2019-08-07 | End: 2019-08-08

## 2019-08-07 RX ORDER — ERYTHROMYCIN 5 MG/G
OINTMENT OPHTHALMIC
Status: DISCONTINUED | OUTPATIENT
Start: 2019-08-07 | End: 2019-08-07 | Stop reason: CLARIF

## 2019-08-07 RX ORDER — PROPOFOL 10 MG/ML
INJECTION, EMULSION INTRAVENOUS AS NEEDED
Status: DISCONTINUED | OUTPATIENT
Start: 2019-08-07 | End: 2019-08-07 | Stop reason: HOSPADM

## 2019-08-07 RX ORDER — ONDANSETRON 2 MG/ML
4 INJECTION INTRAMUSCULAR; INTRAVENOUS
Status: DISPENSED | OUTPATIENT
Start: 2019-08-07 | End: 2019-08-08

## 2019-08-07 RX ORDER — DOCUSATE SODIUM 100 MG/1
100 CAPSULE, LIQUID FILLED ORAL DAILY
Status: DISCONTINUED | OUTPATIENT
Start: 2019-08-08 | End: 2019-08-09 | Stop reason: HOSPADM

## 2019-08-07 RX ORDER — KETOROLAC TROMETHAMINE 30 MG/ML
INJECTION, SOLUTION INTRAMUSCULAR; INTRAVENOUS AS NEEDED
Status: DISCONTINUED | OUTPATIENT
Start: 2019-08-07 | End: 2019-08-07 | Stop reason: HOSPADM

## 2019-08-07 RX ORDER — SODIUM CHLORIDE, SODIUM LACTATE, POTASSIUM CHLORIDE, CALCIUM CHLORIDE 600; 310; 30; 20 MG/100ML; MG/100ML; MG/100ML; MG/100ML
125 INJECTION, SOLUTION INTRAVENOUS CONTINUOUS
Status: DISCONTINUED | OUTPATIENT
Start: 2019-08-07 | End: 2019-08-07 | Stop reason: HOSPADM

## 2019-08-07 RX ORDER — CEFAZOLIN SODIUM/WATER 2 G/20 ML
2 SYRINGE (ML) INTRAVENOUS ONCE
Status: DISCONTINUED | OUTPATIENT
Start: 2019-08-07 | End: 2019-08-07 | Stop reason: HOSPADM

## 2019-08-07 RX ORDER — ONDANSETRON 2 MG/ML
4 INJECTION INTRAMUSCULAR; INTRAVENOUS
Status: DISCONTINUED | OUTPATIENT
Start: 2019-08-07 | End: 2019-08-09 | Stop reason: HOSPADM

## 2019-08-07 RX ORDER — BUPIVACAINE HYDROCHLORIDE 7.5 MG/ML
INJECTION, SOLUTION INTRASPINAL AS NEEDED
Status: DISCONTINUED | OUTPATIENT
Start: 2019-08-07 | End: 2019-08-07 | Stop reason: HOSPADM

## 2019-08-07 RX ORDER — MORPHINE SULFATE 1 MG/ML
INJECTION, SOLUTION EPIDURAL; INTRATHECAL; INTRAVENOUS AS NEEDED
Status: DISCONTINUED | OUTPATIENT
Start: 2019-08-07 | End: 2019-08-07 | Stop reason: HOSPADM

## 2019-08-07 RX ORDER — MIDAZOLAM HYDROCHLORIDE 1 MG/ML
INJECTION, SOLUTION INTRAMUSCULAR; INTRAVENOUS AS NEEDED
Status: DISCONTINUED | OUTPATIENT
Start: 2019-08-07 | End: 2019-08-07 | Stop reason: HOSPADM

## 2019-08-07 RX ORDER — FACIAL-BODY WIPES
10 EACH TOPICAL
Status: DISCONTINUED | OUTPATIENT
Start: 2019-08-07 | End: 2019-08-09 | Stop reason: HOSPADM

## 2019-08-07 RX ORDER — CARBOPROST TROMETHAMINE 250 UG/ML
250 INJECTION, SOLUTION INTRAMUSCULAR
Status: DISPENSED | OUTPATIENT
Start: 2019-08-07 | End: 2019-08-08

## 2019-08-07 RX ORDER — LIDOCAINE HYDROCHLORIDE 10 MG/ML
INJECTION INFILTRATION; PERINEURAL AS NEEDED
Status: DISCONTINUED | OUTPATIENT
Start: 2019-08-07 | End: 2019-08-07 | Stop reason: HOSPADM

## 2019-08-07 RX ORDER — SODIUM CHLORIDE, SODIUM LACTATE, POTASSIUM CHLORIDE, CALCIUM CHLORIDE 600; 310; 30; 20 MG/100ML; MG/100ML; MG/100ML; MG/100ML
150 INJECTION, SOLUTION INTRAVENOUS CONTINUOUS
Status: DISPENSED | OUTPATIENT
Start: 2019-08-07 | End: 2019-08-07

## 2019-08-07 RX ORDER — METHYLERGONOVINE MALEATE 0.2 MG/ML
0.2 INJECTION INTRAVENOUS
Status: ACTIVE | OUTPATIENT
Start: 2019-08-07 | End: 2019-08-08

## 2019-08-07 RX ORDER — SODIUM CHLORIDE, SODIUM LACTATE, POTASSIUM CHLORIDE, CALCIUM CHLORIDE 600; 310; 30; 20 MG/100ML; MG/100ML; MG/100ML; MG/100ML
125 INJECTION, SOLUTION INTRAVENOUS CONTINUOUS
Status: DISCONTINUED | OUTPATIENT
Start: 2019-08-07 | End: 2019-08-09 | Stop reason: HOSPADM

## 2019-08-07 RX ADMIN — PROPOFOL 30 MG: 10 INJECTION, EMULSION INTRAVENOUS at 08:25

## 2019-08-07 RX ADMIN — HYDROMORPHONE HYDROCHLORIDE 1 MG: 1 INJECTION, SOLUTION INTRAMUSCULAR; INTRAVENOUS; SUBCUTANEOUS at 11:25

## 2019-08-07 RX ADMIN — PROPOFOL 20 MG: 10 INJECTION, EMULSION INTRAVENOUS at 08:54

## 2019-08-07 RX ADMIN — Medication: at 19:42

## 2019-08-07 RX ADMIN — SODIUM CHLORIDE, SODIUM LACTATE, POTASSIUM CHLORIDE, AND CALCIUM CHLORIDE 150 ML/HR: 600; 310; 30; 20 INJECTION, SOLUTION INTRAVENOUS at 06:30

## 2019-08-07 RX ADMIN — SODIUM CHLORIDE, SODIUM LACTATE, POTASSIUM CHLORIDE, AND CALCIUM CHLORIDE 500 ML: 600; 310; 30; 20 INJECTION, SOLUTION INTRAVENOUS at 07:08

## 2019-08-07 RX ADMIN — PROPOFOL 20 MG: 10 INJECTION, EMULSION INTRAVENOUS at 08:48

## 2019-08-07 RX ADMIN — MISOPROSTOL 800 MCG: 100 TABLET ORAL at 09:25

## 2019-08-07 RX ADMIN — KETAMINE HYDROCHLORIDE 10 MG: 10 INJECTION, SOLUTION INTRAMUSCULAR; INTRAVENOUS at 08:15

## 2019-08-07 RX ADMIN — KETAMINE HYDROCHLORIDE 10 MG: 10 INJECTION, SOLUTION INTRAMUSCULAR; INTRAVENOUS at 08:25

## 2019-08-07 RX ADMIN — PROPOFOL 20 MG: 10 INJECTION, EMULSION INTRAVENOUS at 08:45

## 2019-08-07 RX ADMIN — PROPOFOL 20 MG: 10 INJECTION, EMULSION INTRAVENOUS at 08:42

## 2019-08-07 RX ADMIN — Medication 10 ML: at 15:52

## 2019-08-07 RX ADMIN — BUPIVACAINE HYDROCHLORIDE 1.4 ML: 7.5 INJECTION, SOLUTION INTRASPINAL at 07:42

## 2019-08-07 RX ADMIN — HYDROMORPHONE HYDROCHLORIDE 1 MG: 1 INJECTION, SOLUTION INTRAMUSCULAR; INTRAVENOUS; SUBCUTANEOUS at 10:26

## 2019-08-07 RX ADMIN — LIDOCAINE HYDROCHLORIDE 3 ML: 10 INJECTION INFILTRATION; PERINEURAL at 07:40

## 2019-08-07 RX ADMIN — MIDAZOLAM HYDROCHLORIDE 2 MG: 1 INJECTION, SOLUTION INTRAMUSCULAR; INTRAVENOUS at 08:13

## 2019-08-07 RX ADMIN — OXYCODONE HYDROCHLORIDE AND ACETAMINOPHEN 1 TABLET: 5; 325 TABLET ORAL at 22:38

## 2019-08-07 RX ADMIN — ONDANSETRON 4 MG: 2 INJECTION INTRAMUSCULAR; INTRAVENOUS at 07:52

## 2019-08-07 RX ADMIN — PROPOFOL 20 MG: 10 INJECTION, EMULSION INTRAVENOUS at 08:36

## 2019-08-07 RX ADMIN — FENTANYL CITRATE 100 MCG: 50 INJECTION, SOLUTION INTRAMUSCULAR; INTRAVENOUS at 08:12

## 2019-08-07 RX ADMIN — PROPOFOL 20 MG: 10 INJECTION, EMULSION INTRAVENOUS at 08:57

## 2019-08-07 RX ADMIN — PROPOFOL 20 MG: 10 INJECTION, EMULSION INTRAVENOUS at 08:30

## 2019-08-07 RX ADMIN — Medication: at 11:36

## 2019-08-07 RX ADMIN — KETOROLAC TROMETHAMINE 30 MG: 30 INJECTION, SOLUTION INTRAMUSCULAR at 22:38

## 2019-08-07 RX ADMIN — SODIUM CHLORIDE, SODIUM LACTATE, POTASSIUM CHLORIDE, AND CALCIUM CHLORIDE: 600; 310; 30; 20 INJECTION, SOLUTION INTRAVENOUS at 07:58

## 2019-08-07 RX ADMIN — OXYCODONE HYDROCHLORIDE AND ACETAMINOPHEN 2 TABLET: 5; 325 TABLET ORAL at 09:25

## 2019-08-07 RX ADMIN — PROPOFOL 20 MG: 10 INJECTION, EMULSION INTRAVENOUS at 08:33

## 2019-08-07 RX ADMIN — ONDANSETRON 4 MG: 2 INJECTION INTRAMUSCULAR; INTRAVENOUS at 13:27

## 2019-08-07 RX ADMIN — PROPOFOL 20 MG: 10 INJECTION, EMULSION INTRAVENOUS at 08:27

## 2019-08-07 RX ADMIN — SIMETHICONE CHEW TAB 80 MG 80 MG: 80 TABLET ORAL at 15:04

## 2019-08-07 RX ADMIN — KETAMINE HYDROCHLORIDE 10 MG: 10 INJECTION, SOLUTION INTRAMUSCULAR; INTRAVENOUS at 08:20

## 2019-08-07 RX ADMIN — MORPHINE SULFATE 0.15 MG: 1 INJECTION, SOLUTION EPIDURAL; INTRATHECAL; INTRAVENOUS at 07:42

## 2019-08-07 RX ADMIN — Medication 20 ML: at 08:12

## 2019-08-07 RX ADMIN — METHYLERGONOVINE MALEATE 0.2 MG: 0.2 INJECTION INTRAVENOUS at 08:18

## 2019-08-07 RX ADMIN — SODIUM CHLORIDE, SODIUM LACTATE, POTASSIUM CHLORIDE, AND CALCIUM CHLORIDE 125 ML/HR: 600; 310; 30; 20 INJECTION, SOLUTION INTRAVENOUS at 16:33

## 2019-08-07 RX ADMIN — PROPOFOL 20 MG: 10 INJECTION, EMULSION INTRAVENOUS at 08:51

## 2019-08-07 RX ADMIN — METHYLERGONOVINE MALEATE 0.2 MG: 0.2 INJECTION INTRAVENOUS at 08:11

## 2019-08-07 RX ADMIN — Medication 2 G: at 07:46

## 2019-08-07 RX ADMIN — PROPOFOL 20 MG: 10 INJECTION, EMULSION INTRAVENOUS at 08:39

## 2019-08-07 RX ADMIN — KETOROLAC TROMETHAMINE 30 MG: 30 INJECTION, SOLUTION INTRAMUSCULAR at 15:50

## 2019-08-07 RX ADMIN — Medication 20 ML: at 08:56

## 2019-08-07 RX ADMIN — KETOROLAC TROMETHAMINE 30 MG: 30 INJECTION, SOLUTION INTRAMUSCULAR; INTRAVENOUS at 09:07

## 2019-08-07 NOTE — LACTATION NOTE
This note was copied from a baby's chart. Mom had many questions regarding Albino Raw much is she getting? \" discussed WNL and supply/demand. Infant latched and nursing well at 1730. Will page for assistance if needed.

## 2019-08-07 NOTE — INTERVAL H&P NOTE
H&P Update:  Charles Gomez was seen and examined. History and physical has been reviewed. The patient has been examined.  There have been no significant clinical changes since the completion of the originally dated History and Physical.

## 2019-08-07 NOTE — PROGRESS NOTES
0600 - Patient arrived to unit via ambulation as a  at for scheduled repeat . Patient denies LOF or vaginal bleeding. Patient states contractions every intermittent. Patient taken to LR1 and given gown to change. 6235 - EFM and TOCO applied. Assessment complete. 06 - Dr. Chivo Mejia at bedside. POC discussed. 0715 - Bedside and Verbal shift change report given to CITLALI Powers RN (oncoming nurse) by Leonard Alexander RN (offgoing nurse). Report included the following information SBAR, Kardex, Intake/Output, MAR and Recent Results.

## 2019-08-07 NOTE — PROGRESS NOTES
Bedside and Verbal shift change report given to MACY Cash RN (oncoming nurse) by RONEL Jones RN (offgoing nurse). Report included the following information SBAR, Kardex, Intake/Output, MAR and Recent Results. 1942- PCA rate verified with [address], RN. Rate/dose verified, NOT new bag as was scanned in error. RN off unit and unable to reverify at this time. 1945- Pt requesting use of abdominal binder. Also states she wants PCA pump removed as well as her bajwa catheter tonight. She expressed that the dilaudid was not helping as much as the percocet did earlier today and her abdomen is very tender all over, but does not feel like gas pain. She wants to be able to get up and walk around tonight. Tolerating a clear liquid diet. 2000- Paged KI Ornelas with patient's concerns and discussed POC/requests. CNM okay with use of abdominal binder, removing bajwa at 12 hours post op and removing PCA pump per patient request, using Toradol/Percocet for pain control. Will update patient and manage POC. 2040- PCA pump removed, pt appears comfortable at this time. Feeding infant at this time, will call when ready for assistance. 2110- Orders for bajwa discontinuation at 0600 on 8/8 per Dr. Matilda Alaniz, previously missed in order set. Patient agreeable to continuing catheter until am instead of removing now. Output has been remarkable, clear yellow and IV fluids still infusing. Able to get up with assistance and into nursery for infant's bath. 2240- Pt pain medications given. Pain moderate at this time, patient requesting only 1 tablet of percocet, and Toradol per order. Ambulates steady at this time. 0010- Fundus @U, small amount of bleeding. Pad changed and bajwa care performed. Lights dimmed for rest, SCDs in place. 7208- Woke patient for infant feeding. Patient states she had passed a lot of gas recently. Pain well controlled at this time.      5684- Dr. Matilda Alaniz on unit, okay with bajwa removal at this time. Pt with adequate output. 0720- Bedside and Verbal shift change report given to MAYA Olea RN (oncoming nurse) by Lolly Rhodes. Wilma Garcia RN (offgoing nurse). Report included the following information SBAR, Kardex, Intake/Output, MAR and Recent Results.

## 2019-08-07 NOTE — PROGRESS NOTES
0 Spoke with Dr. Luis Mtz regarding patient not liking the PCA pump and feeling nauseated. He said to change PCA to 0 basal and 0.6 per hour. Updated PCA order. 4040 [facility]. with Dr. Luis Mtz regarding patients abdominal pain with upper abdominal bloating and that patient has 0 bleeding on her pad. He said she can have chewable mylicon for gas.

## 2019-08-07 NOTE — PROGRESS NOTES
Problem:  Delivery: Day of Delivery  Goal: Activity/Safety  Outcome: Progressing Towards Goal  Goal: Consults, if ordered  Outcome: Progressing Towards Goal  Goal: Diagnostic Test/Procedures  Outcome: Progressing Towards Goal  Goal: Nutrition/Diet  Outcome: Progressing Towards Goal  Goal: Discharge Planning  Outcome: Progressing Towards Goal  Goal: Medications  Outcome: Progressing Towards Goal  Goal: Respiratory  Outcome: Progressing Towards Goal  Goal: Treatments/Interventions/Procedures  Outcome: Progressing Towards Goal  Goal: Psychosocial  Outcome: Progressing Towards Goal  Goal: *Vital signs within defined limits  Outcome: Progressing Towards Goal  Goal: *Labs within defined limits  Outcome: Progressing Towards Goal  Goal: *Hemodynamically stable  Outcome: Progressing Towards Goal  Goal: *Optimal pain control at patient's stated goal  Outcome: Progressing Towards Goal  Goal: *Participates in infant care  Outcome: Progressing Towards Goal  Goal: *Demonstrates progressive activity  Outcome: Progressing Towards Goal  Goal: *Tolerating diet  Outcome: Progressing Towards Goal     Problem: Patient Education: Go to Patient Education Activity  Goal: Patient/Family Education  Outcome: Progressing Towards Goal

## 2019-08-07 NOTE — LACTATION NOTE
Infant latched and nursing well. Mom educated on breastfeeding basics--hunger cues, feeding on demand, waking baby if baby sleeps too long between feeds, importance of skin to skin, positioning and latching, risk of pacifier use and supplemental feedings, and importance of rooming in--and use of log sheet. Mom also educated on benefits of breastfeeding for herself and baby. Mom verbalized understanding. No questions at this time. 1150 Per mom, was in pain and got started on pain medicine which has made her unable to hold her baby or attempt to feed and worried about infant \"starving. \" Infant currently sleeping and fed 2 hours ago. Discussed feeding on demand and that infant is ok to not feed right now until mom feels better. Encouraged to call if infant fusses and LC will assist with latching. Mom verbalized understanding and no questions at this time.

## 2019-08-07 NOTE — PROGRESS NOTES
0715 Bedside and Verbal shift change report given to Sandy Lehman RN  (oncoming nurse) by Dale Can RN (offgoing nurse). Report included the following information SBAR, Kardex, OR Summary, Procedure Summary, Intake/Output, MAR, Recent Results and Med Rec Status. Dr Roberta Shell in room doing informed consent. 0731 IN OR.    0757 Time out.    0808 Uterine incision. 0810 Repeat c/s for viable girl. Placenta sent to pathology. 3901 Methergine given by anesthesia. Dr Jhoana Encarnacion requesting 800mg cytotec rectally to be given after surgery. 0916 Pt in room 1 for PACU care. [id number] Paged Dr Roebrta Philippeoked regarding pt's pain post c/s. Percoset did not work so far. Orders received. See eMAR. 1136 PCA pump set up and double verified by second RN per Md orders. 2963 [facility]  New pad placed. 1239 TRANSFER - OUT REPORT:    Verbal report given to Sulma Carrasco RN (name) on [facility]  being transferred to [facility] (unit) for routine progression of care       Report consisted of patients Situation, Background, Assessment and   Recommendations(SBAR). Information from the following report(s) SBAR, Kardex, OR Summary, Procedure Summary, Intake/Output, MAR, Recent Results and Med Rec Status was reviewed with the receiving nurse. Lines:   Peripheral IV 08/07/19 Right Hand (Active)   Site Assessment Clean, dry, & intact 8/7/2019  6:34 AM   Phlebitis Assessment 0 8/7/2019  6:34 AM   Infiltration Assessment 0 8/7/2019  6:34 AM   Dressing Status Clean, dry, & intact 8/7/2019  6:34 AM   Dressing Type Tape;Transparent 8/7/2019  6:34 AM   Hub Color/Line Status Green; Infusing 8/7/2019  6:34 AM        Opportunity for questions and clarification was provided.       Patient transported with:   Registered Nurse

## 2019-08-07 NOTE — ANESTHESIA PROCEDURE NOTES
Spinal Block    Start time: 8/7/2019 7:39 AM  End time: 8/7/2019 7:42 AM  Performed by: Adriana Mensah DO  Authorized by: Adriana Mensah DO     Pre-procedure:   Indications: at surgeon's request and primary anesthetic  Preanesthetic Checklist: patient identified, risks and benefits discussed, anesthesia consent, site marked, patient being monitored and timeout performed    Timeout Time: 07:39          Spinal Block:   Patient Position:  Seated  Prep Region:  Lumbar  Prep: chlorhexidine and patient draped      Location:  L3-4  Technique:  Single shot        Needle:   Needle Type:  Pencan  Needle Gauge:  25 G  Attempts:  1      Events: CSF confirmed, no blood with aspiration and no paresthesia        Assessment:  Insertion:  Uncomplicated  Patient tolerance:  Patient tolerated the procedure well with no immediate complications

## 2019-08-07 NOTE — ANESTHESIA PREPROCEDURE EVALUATION
Relevant Problems   No relevant active problems       Anesthetic History     PONV   Pertinent negatives: No increased risk of difficult airway, pseudocholinesterase deficiency, malignant hyperthermia and history of awareness of surgery under anesthesia       Review of Systems / Medical History  Patient summary reviewed, nursing notes reviewed and pertinent labs reviewed    Pulmonary  Within defined limits                 Neuro/Psych   Within defined limits           Cardiovascular  Within defined limits                Exercise tolerance: >4 METS     GI/Hepatic/Renal     GERD        Pertinent negatives: No PUD, hepatitis, liver disease and renal disease  Comments: GERD during pregnancy Endo/Other          Pertinent negatives: No diabetes, hypothyroidism, hyperthyroidism and blood dyscrasia   Other Findings              Physical Exam    Airway  Mallampati: II  TM Distance: 4 - 6 cm  Neck ROM: normal range of motion   Mouth opening: Normal     Cardiovascular  Regular rate and rhythm,  S1 and S2 normal,  no murmur, click, rub, or gallop             Dental  No notable dental hx       Pulmonary  Breath sounds clear to auscultation               Abdominal  GI exam deferred       Other Findings            Anesthetic Plan    ASA: 2  Anesthesia type: spinal            Anesthetic plan and risks discussed with: Patient and Spouse      Spinal for repeat C/S

## 2019-08-07 NOTE — ANESTHESIA POSTPROCEDURE EVALUATION
Post-Anesthesia Evaluation & Assessment    Visit Vitals  /69   Pulse 92   Temp 36.8 °C (98.2 °F)   Resp 17   SpO2 100%   Breastfeeding? Unknown       Nausea/Vomiting: Controlled. Post-operative hydration adequate. Pain Scale 1: Numeric (0 - 10) (08/07/19 1144)  Pain Intensity 1: 4 (08/07/19 1144)   Managed    Pain score at or below stated goal level. Mental status & Level of consciousness: alert and oriented x 3    Neurological status: spinal resolved    Pulmonary status: airway patent, adequate oxygenation. Complications related to anesthesia: none    Patient has met all PACU discharge requirements.       Kira Steel DO

## 2019-08-07 NOTE — CONSULTS
8 hrs pop ,pain management rds, pe wnl pop day o, resolving ilius, uterus firm 12-14 wk size nt, min lochea, ext wnl, plan cont pca w/o bkgrnd, limited cl liq diet , cont bajwa, up only with rn assist. Note dictated # [id number].

## 2019-08-07 NOTE — PROGRESS NOTES
1330  Rounded on patient and completed head to toe assessment. No further needs at this time. 1430  Reassessed pain level. 1600  Rounded on patient and completed VS and fundal check. No further needs at this time. 1700  Rounded on patient and no further needs at this time. 1840  Rounded on patient, eating broth, no further needs at this time. 1920  Bedside and Verbal shift change report given to MACY Cash RN (oncoming nurse) by RONEL Jones RN (offgoing nurse). Report included the following information SBAR, Kardex, Intake/Output, MAR and Recent Results.

## 2019-08-07 NOTE — OP NOTES
Section Delivery Procedure Note    Name: [address] Record Number: [id number]   YOB: 1980  Today's Date: 2019      Preoperative Diagnosis: PREVIOUS  SECTION ama pmh gbs+    Postoperative Diagnosis: geraldine    Procedure: Low Cervical Transverse Procedure(s):  REPEAT  SECTION    Surgeon(s):  Jostin Bates MD    Anesthesia: Epidural    Prenatal Labs:   Lab Results   Component Value Date/Time    ABO/Rh(D) O NEGATIVE 2019 12:55 PM    HBsAg, External negative 2018    HIV, External negative 2018    Rubella, External immune 2018    RPR, External non reactive 2018    Gonorrhea, External negative 2018    Chlamydia, External negative 2018    GrBStrep, External postitive 2013        Prophylactic Antibiotics: Ancef pre-op Ancef pre-delivery 1 doses. Procedure Details:    See dictation.  Ticket number [id number]      Estimated Blood Loss: 1000 ml   Replacement: o    Fetal Description: coronado female    Apgar - One Minute: 9    Apgar - Five Minutes: 9    Umbilical Cord: Nuchal Cord x  1             Cord Blood Results:   Information for the patient's :  Mini Dykes [[id number]]   No results found for: PCTABR, PCTDIG, BILI, ABORH         Birth Information:   Information for the patient's :  Mini Dykes [[id number]]          Placenta:  manual removal to path    Specimens:   ID Type Source Tests Collected by Time Destination   1 :  Fresh Placenta  Jostin Bates MD 2019 0813 Pathology          Maternal Findings    Uterus Size: enlarged, Fibroids: yes  ?, Adhesions: {Minimal, Anomalies: Mild -  Defects: no   Tubes normal   Ovaries normal   Abdomen Adhesions: None   Pelvis Adhesions: None            Complications:  none     Drains:   bajwa      Birth Weight: 7#13Oz        Mother's Condition: good  Baby's Condition: good    Signed: Shane Villareal MD      2019

## 2019-08-08 LAB
BASOPHILS # BLD: 0 K/UL (ref 0–0.1)
BASOPHILS NFR BLD: 0 % (ref 0–2)
DIFFERENTIAL METHOD BLD: ABNORMAL
EOSINOPHIL # BLD: 0.1 K/UL (ref 0–0.4)
EOSINOPHIL NFR BLD: 1 % (ref 0–5)
ERYTHROCYTE [DISTWIDTH] IN BLOOD BY AUTOMATED COUNT: 13.9 % (ref 11.6–14.5)
HCT VFR BLD AUTO: 30.6 % (ref 35–45)
HGB BLD-MCNC: 10.1 G/DL (ref 12–16)
LYMPHOCYTES # BLD: 1.4 K/UL (ref 0.9–3.6)
LYMPHOCYTES NFR BLD: 16 % (ref 21–52)
MCH RBC QN AUTO: 32.2 PG (ref 24–34)
MCHC RBC AUTO-ENTMCNC: 33 G/DL (ref 31–37)
MCV RBC AUTO: 97.5 FL (ref 74–97)
MONOCYTES # BLD: 0.6 K/UL (ref 0.05–1.2)
MONOCYTES NFR BLD: 7 % (ref 3–10)
NEUTS SEG # BLD: 6.4 K/UL (ref 1.8–8)
NEUTS SEG NFR BLD: 76 % (ref 40–73)
PLATELET # BLD AUTO: 127 K/UL (ref 135–420)
PMV BLD AUTO: 10.3 FL (ref 9.2–11.8)
RBC # BLD AUTO: 3.14 M/UL (ref 4.2–5.3)
WBC # BLD AUTO: 8.4 K/UL (ref 4.6–13.2)

## 2019-08-08 PROCEDURE — 74011250636 HC RX REV CODE- 250/636: Performed by: OBSTETRICS & GYNECOLOGY

## 2019-08-08 PROCEDURE — 85461 HEMOGLOBIN FETAL: CPT

## 2019-08-08 PROCEDURE — 86900 BLOOD TYPING SEROLOGIC ABO: CPT

## 2019-08-08 PROCEDURE — 85025 COMPLETE CBC W/AUTO DIFF WBC: CPT

## 2019-08-08 PROCEDURE — 65270000029 HC RM PRIVATE

## 2019-08-08 PROCEDURE — 36415 COLL VENOUS BLD VENIPUNCTURE: CPT

## 2019-08-08 PROCEDURE — 74011250637 HC RX REV CODE- 250/637: Performed by: OBSTETRICS & GYNECOLOGY

## 2019-08-08 RX ORDER — IBUPROFEN 400 MG/1
800 TABLET ORAL
Status: DISCONTINUED | OUTPATIENT
Start: 2019-08-08 | End: 2019-08-09 | Stop reason: HOSPADM

## 2019-08-08 RX ADMIN — OXYCODONE HYDROCHLORIDE AND ACETAMINOPHEN 1 TABLET: 5; 325 TABLET ORAL at 03:02

## 2019-08-08 RX ADMIN — IBUPROFEN 800 MG: 400 TABLET ORAL at 17:37

## 2019-08-08 RX ADMIN — OXYCODONE HYDROCHLORIDE AND ACETAMINOPHEN 1 TABLET: 5; 325 TABLET ORAL at 16:37

## 2019-08-08 RX ADMIN — KETOROLAC TROMETHAMINE 30 MG: 30 INJECTION, SOLUTION INTRAMUSCULAR at 10:02

## 2019-08-08 RX ADMIN — HUMAN RHO(D) IMMUNE GLOBULIN 0.3 MG: 300 INJECTION, SOLUTION INTRAMUSCULAR at 09:48

## 2019-08-08 RX ADMIN — Medication 10 ML: at 04:28

## 2019-08-08 RX ADMIN — KETOROLAC TROMETHAMINE 30 MG: 30 INJECTION, SOLUTION INTRAMUSCULAR at 04:28

## 2019-08-08 NOTE — PROGRESS NOTES
2656: Bedside and Verbal shift change report given to MAYA Olea (oncoming nurse) by MACY Cash (offgoing nurse). Report included the following information SBAR, Intake/Output, MAR and Recent Results. 0720: Introduction to Pt, Discussed care plan, Pt verbalizes understanding of care plan. Safety issues check. Pt denies needs or assistance at this time. 0750: Morning assessment completed, pt up walking around room,  swaddled and sleeping in crib at bedside. Pt denies further assistance needed at this time    1225: Pt up in bed nursing, pt requested that Toradol be discontinued, along with IV taking out and motrin be started. 1900: Bedside and Verbal shift change report given to CAROL Posadas (oncoming nurse) by MAYA Olea (offgoing nurse). Report included the following information SBAR, Procedure Summary, Intake/Output, MAR and Recent Results.

## 2019-08-08 NOTE — CONSULTS
[address] [facility]    Name:  Deanne Baker  MR#:   [id number]  :  1980  ACCOUNT #:  [de-identified]  DATE OF SERVICE:  2019    COMPLEX ROUNDING NOTE    HISTORY OF PRESENT ILLNESS:  The patient currently has had some pain management regulatory issues and now is on a Dilaudid PCA as recommended by Anesthesia. Please the dictated operative note. Currently is well controlled on 0.1 mg of Dilaudid with the patient's lockout at 10 minutes and the PCA background has been discontinued at 3 p.m. Current chief complaint is upper mid epigastric symptomatology, although has more of the symptoms of gas. Bowel sounds are normal  but slt distended at the upper mid epigastric area, but the bowel sounds are present. There are no air-fluid level sounds. Uterus is firm, nontender, about 12 to 14 week equivalent size. Dressing is dry and clean. Lochia scant. Extremities are normal including the left leg. Pneumatic stockings are on and working. At the present time, the patient's pain management seems to be reasonably controlled as to my first evaluation postoperatively, and our plans are to continue with Dilaudid PCA mostly likely until tomorrow morning, and we have given her some instructions to limit the type of eating that she does have as this may cause her some irregularity with her constipation problems. Hence the patient be primarily on just liquids in small amounts until tomorrow morning and the Douglas catheter is in place. Douglas is clean and clear. IMPRESSION:  We do have resolving ileus and stabilized pain management. PLAN:  Continue the PCA with only the patient boluses recommended. Ambulation most likely tomorrow after we take out the Douglas catheter system. Pneumatic stockings will be worn at all times when the patient is not ambulating.       MD JEN Holliday/[email or web address]  D:  2019 16:09  T:  2019 17:00  JOB #:  [id number]

## 2019-08-08 NOTE — LACTATION NOTE
Per mom, infant latching and nursing well, but feels infant cluster fed last night and/or using her \"as a pacifier. \" Discussed cluster feeding and nutritive vs non nutritive sucking. Mom to page today for feedings.

## 2019-08-08 NOTE — PROGRESS NOTES
1915 Bedside and Verbal shift change report given to CAROL Ross RN (oncoming nurse) by MAYA Olea RN (offgoing nurse). Report included the following information SBAR, Kardex, Intake/Output, MAR and Recent Results. 2124 pt in bed no needs to be addressed at this time. 2215 no needs to be addressed at this time however pt requesting bottle or pump for baby due to complaints of sore cracking nipples. encouraged mom to continue using lanolin. It was also explained pumping is not recommended unless medically necessary until 3 weeks postpartum so the milk supply is not more than what baby requires. Instructed mom how to hand express( spoon given), also gave mom a nipple shield, mom verbalized understanding and was instructed to call if needed any more assistance with feeding. 2300 assessment complete  0122 pt in bed at this time no needs to be addressed no pain at this time. 0214 pain medication given at this time. 0413 pt in bed at this time. No needs to be addressed  0615 pt in bed att this time. 0715 Bedside and Verbal shift change report given to ZEENAT Lan RN (oncoming nurse) by Cassidy Ross RN (offgoing nurse). Report included the following information SBAR, Kardex, Intake/Output, MAR and Recent Results.

## 2019-08-08 NOTE — LACTATION NOTE
Infant latched and nursing when entered room. Assisted mom with re-latching with a deeper latch. Breastfeeding discharge teaching completed to include feeding on demand, foremilk and hindmilk importance, engorgement, mastitis, clogged ducts, pumping, breastmilk storage, and returning to work. Information given about unit and office phone numbers and encouraged mom to reach out if concerns arise, but that [facility] would be calling her in the next few days to follow up on breastfeeding. Mom verbalized understanding and no questions at this time.

## 2019-08-08 NOTE — PROGRESS NOTES
2019  7:46 AM    Anesthesia Post-Op Rounding Note  Daily Management of Intrathecal Opioid    Referring physician: Alba Leija MD   Patient status post Procedure(s):  REPEAT  SECTION on 2019    POST OP Day #1    Visit Vitals  BP 92/56 (BP 1 Location: Right arm, BP Patient Position: At rest)   Pulse 87   Temp 36.9 °C (98.4 °F)   Resp 18   SpO2 97%   Breastfeeding? Unknown         Patient rates pain 2 out of 10. Pain is subjectively rated by patient as mild . No sedation, pruritis, or nausea noted. No complications, adequate analgesia. Continue current postop pain regimen.       Kyle Pierre MD

## 2019-08-08 NOTE — PROGRESS NOTES
Progress Note      Patient: Cammy Long               Sex: female          DOA: 8/7/2019         YOB: 1980      Age:  45 y.o.        LOS:  LOS: 1 day               Subjective:     No cc pain well controled on percocet, pca dced    Objective:      Visit Vitals  BP 92/56 (BP 1 Location: Right arm, BP Patient Position: At rest)   Pulse 87   Temp 98.4 °F (36.9 °C)   Resp 18   SpO2 97%   Breastfeeding? Unknown       Physical Exam:  Pertinent items are noted in HPI. Intake and Output:  Current Shift:  08/07 1901 - 08/08 0700  In: -   Out: 5386 [Urine:2600]  Last three shifts:  08/06 0701 - 08/07 1900  In: 3762 [I.V.:2475]  Out: 4247 [Urine:1700]    Lab/Data Reviewed: All lab results for the last 24 hours reviewed. Medications Reviewed    Continued hospitalization is indicated due to pop day 1      Assessment/Plan     Active Problems:    * No active hospital problems.  *      Resolving ilius    dc perhaps  tomorrow rx rho sylvia today

## 2019-08-08 NOTE — OP NOTES
[facility] [place]  OPERATIVE REPORT    Name:    MR#:   [id number]  :  1980  ACCOUNT #:  [de-identified]  DATE OF SERVICE:  2019    PREOPERATIVE DIAGNOSES:  A 39 plus 3 weeks' gestation, vertex presentation, group B strep positive status, advanced maternal age, for repeat  section. POSTOPERATIVE DIAGNOSES:  39 plus 3 weeks' gestation, vertex presentation, group B strep positive status, advanced maternal age, for repeat  section and meconium fluid, one nuchal cord. PROCEDURE PERFORMED:  Repeat low transverse  section delivery of intrauterine pregnancy. SURGEON:  aiden santos    ASSISTANT:  See circ note    ANESTHESIA:   spinal.    ANESTHESIOLOGIST:  Belinda Wright. DO Mojgan.    NEONATOLOGIST:  Monica Urbina MD.    PROPHYLAXIS:  Ancef. COMPLICATIONS:  None. TRANSFUSIONS:  None. SPECIMENS REMOVED:  Placenta to path. IMPLANTS:  o.    ESTIMATED BLOOD LOSS:  800cc. PROCEDURE:  The patient is a 79-year-old  3, para 2 by  section  female, taken to the operative suite with the preoperative diagnoses as above. The patient was placed under what appeared to be adequate spinal Duramorph anesthesia and felt numbness to the skin. After prepping and draping, the Douglas catheter in place, heartbeats were 140 beats per minute in the right lower quadrant prior to prepping and draping and appropriate time-out was accomplished and agreed upon by all parties. Pfannenstiel incision was then re-made removing previous scar. As we got that to the fascial plane, there was some expression of having some discomfort. Anesthesia was called to give the patient some assistance, some was given after calling Dr. Oswaldo Sandifer, the patient appeared to be reasonably comfortable at that point, fascia had then incised sharply undermining  inferiorly and superiorly bluntly. Peritoneum was entered sharply without difficulty.   The bladder flap was created transversely undermining the inferiorly bluntly, bladder blade placed. A sharp knife was utilized to go through a thinned out lower segment of the uterus and extended bluntly. On entering to the amniotic sac, we got meconium fluid, Dr. Mu Harrington, the Neonatology was in attendance. Next, we delivered with an Apgar 9 and 9; 7 pound 13 ounce female infant with reduction of a nuchal cord without difficulty. The nasopharyngeal suction appeared to be reasonably clear. There was no intubation necessary by Neonatology. Cord blood was taken. The cord blood pH were not necessary because of the Apgar scores. The placenta was delivered, three-vessel intact and submitted to Pathology because of the meconium fluid. Next, the uterine confines were wiped with a dry lap, the patient was given additional analgesics at this particular point to assist this in the management of the discomfort and felt to be comfortable. This was after also managing earlier the fascia type discomfort. Lower segment delineated patent with the ring forceps, sent off as a dirty instrument. The patient was given Pitocin and 0.2 mg of Methergine to maintain uterine tone. Next, the low transverse incision was closed in the following fashion; #1 chromic running interlock stitch myometrial closure an imbricating suture, 2-0 chromic to replace the bladder to the serosa of the uterus. Next, after copious amounts of irrigation were utilized to cleanse the pelvic and lower abdominal recess. Correct needle, sponge, and instrument count and estimated blood loss about 700 mL. The anterior abdominal wall was closed in the following fashion; #0 chromic for reperitonealization, #1 chromic for patient's diastasis, #1 Vicryl running interlock stitch for fascial closure, 2-0 Vicryl running stitch for subcutaneous closure, and 3-0 Monocryl for subcuticular stitch. The pressure bandage was applied.   The patient then had 800 mcg of Cytotec placed rectally to maintain uterine tone and the patient was taken to the recovery room. Douglas catheter in place. Complications none. Findings as in the operative summary.       Caden Gamboa MD      [id number]  D:  08/07/2019 9:09  T:  08/07/2019 18:20  JOB #:  [id number]

## 2019-08-08 NOTE — PROGRESS NOTES
Problem: Lactation Care Plan  Goal: *Infant latching appropriately  Outcome: Progressing Towards Goal  Goal: *Weight loss less than 10% of birth weight  Outcome: Progressing Towards Goal     Problem: Patient Education: Go to Patient Education Activity  Goal: Patient/Family Education  Outcome: Progressing Towards Goal

## 2019-08-09 VITALS
TEMPERATURE: 98.4 F | SYSTOLIC BLOOD PRESSURE: 110 MMHG | DIASTOLIC BLOOD PRESSURE: 76 MMHG | RESPIRATION RATE: 14 BRPM | OXYGEN SATURATION: 99 % | HEART RATE: 79 BPM

## 2019-08-09 LAB
ABO + RH BLD: NORMAL
ABO + RH BLDCO: NORMAL
BLD PROD TYP BPU: NORMAL
BPU ID: NORMAL
CALLED TO:,BCALL1: NORMAL
FETAL SCREEN,FMHS: NORMAL
STATUS OF UNIT,%ST: NORMAL
UNIT DIVISION, %UDIV: 0

## 2019-08-09 PROCEDURE — 74011250637 HC RX REV CODE- 250/637: Performed by: OBSTETRICS & GYNECOLOGY

## 2019-08-09 RX ADMIN — OXYCODONE HYDROCHLORIDE AND ACETAMINOPHEN 1 TABLET: 5; 325 TABLET ORAL at 02:14

## 2019-08-09 RX ADMIN — IBUPROFEN 800 MG: 400 TABLET ORAL at 02:14

## 2019-08-09 NOTE — DISCHARGE SUMMARY
Discharge Summary    Patient: August Palma               Sex: female          DOA: 2019         YOB: 1980      Age:  45 y.o.        LOS:  LOS: 2 days                Admit Date: 2019    Discharge Date: 2019    Admission Diagnoses: Labor and delivery, indication for care [O75.9]  Labor and delivery indication for care or intervention [O75.9]    Discharge Diagnoses:    Problem List as of 2019 Date Reviewed: 2019          Codes Class Noted - Resolved    RESOLVED: Labor and delivery indication for care or intervention ICD-10-CM: O75.9  ICD-9-CM: 659.90  2019 - 2019        * (Principal) RESOLVED: Labor and delivery, indication for care ICD-10-CM: O75.9  ICD-9-CM: 659.90  2019 - 2019        RESOLVED: Delivery with history of  section ICD-10-CM: O34.219  ICD-9-CM: 654.21  2019 - 2019        RESOLVED: AMA (advanced maternal age) multigravida 35+, third trimester ICD-10-CM: O09.523  ICD-9-CM: 659.63  2019 - 2019        RESOLVED: 39 weeks gestation of pregnancy ICD-10-CM: Z3A.39  ICD-9-CM: V22.2  2019 - 2019        RESOLVED: H/O  section complicating pregnancy ENM-43-AY: O34.219  ICD-9-CM: 654.20  2013 - 2013              Discharge Condition: Good    Hospital Course: b9    Consults: o    Activity: See surgical instructions    Diet: Regular Diet    Wound Care: As directed    Follow-up: 2 and 6 wk pop check, hosp summary dictated # [id number].

## 2019-08-09 NOTE — PROGRESS NOTES
Problem: Pain  Goal: *Control of Pain  Outcome: Progressing Towards Goal     Problem: Patient Education: Go to Patient Education Activity  Goal: Patient/Family Education  Outcome: Progressing Towards Goal     Problem:  Delivery: Postpartum Day 2  Goal: Off Pathway (Use only if patient is Off Pathway)  Outcome: Progressing Towards Goal  Goal: Activity/Safety  Outcome: Progressing Towards Goal  Goal: Consults, if ordered  Outcome: Progressing Towards Goal  Goal: Nutrition/Diet  Outcome: Progressing Towards Goal  Goal: Discharge Planning  Outcome: Progressing Towards Goal  Goal: Medications  Outcome: Progressing Towards Goal  Goal: Treatments/Interventions/Procedures  Outcome: Progressing Towards Goal  Goal: Psychosocial  Outcome: Progressing Towards Goal  Goal: *Vital signs within defined limits  Outcome: Progressing Towards Goal  Goal: *Labs within defined limits  Outcome: Progressing Towards Goal  Goal: *Hemodynamically stable  Outcome: Progressing Towards Goal  Goal: *Optimal pain control at patient's stated goal  Outcome: Progressing Towards Goal  Goal: *Participates in infant care  Outcome: Progressing Towards Goal  Goal: *Demonstrates progressive activity  Outcome: Progressing Towards Goal  Goal: *Appropriate parent-infant bonding  Outcome: Progressing Towards Goal  Goal: *Tolerating diet  Outcome: Progressing Towards Goal     Problem:  Delivery: Postpartum Day 3  Goal: Off Pathway (Use only if patient is Off Pathway)  Outcome: Progressing Towards Goal  Goal: Activity/Safety  Outcome: Progressing Towards Goal  Goal: Consults, if ordered  Outcome: Progressing Towards Goal  Goal: Nutrition/Diet  Outcome: Progressing Towards Goal  Goal: Discharge Planning  Outcome: Progressing Towards Goal  Goal: Medications  Outcome: Progressing Towards Goal  Goal: Treatments/Interventions/Procedures  Outcome: Progressing Towards Goal  Goal: Psychosocial  Outcome: Progressing Towards Goal     Problem:  Delivery: Discharge Outcomes  Goal: *Follow-up appointments as indicated  Outcome: Progressing Towards Goal  Goal: *Describes available resources and support systems  Outcome: Progressing Towards Goal  Goal: *No signs and symptoms of infection  Outcome: Progressing Towards Goal  Goal: *Birth certificate information completed  Outcome: Progressing Towards Goal  Goal: *Received and verbalizes understanding of discharge plan and instructions  Outcome: Progressing Towards Goal  Goal: *Vital signs within defined limits  Outcome: Progressing Towards Goal  Goal: *Labs within defined limits  Outcome: Progressing Towards Goal  Goal: *Hemodynamically stable  Outcome: Progressing Towards Goal  Goal: *Optimal pain control at patient's stated goal  Outcome: Progressing Towards Goal  Goal: *Participates in infant care  Outcome: Progressing Towards Goal  Goal: *Demonstrates progressive activity  Outcome: Progressing Towards Goal  Goal: *Appropriate parent-infant bonding  Outcome: Progressing Towards Goal  Goal: *Tolerating diet  Outcome: Progressing Towards Goal  Goal: *Verbalizes name, dosage, time, side effects, and number of days to continue medications  Outcome: Progressing Towards Goal     Problem: Falls - Risk of  Goal: *Absence of Falls  Description  Document Damián Fells Fall Risk and appropriate interventions in the flowsheet.   Outcome: Progressing Towards Goal  Note:   Fall Risk Interventions:            Medication Interventions: Teach patient to arise slowly    Elimination Interventions: Call light in reach              Problem: Patient Education: Go to Patient Education Activity  Goal: Patient/Family Education  Outcome: Progressing Towards Goal

## 2019-08-09 NOTE — LACTATION NOTE
Per mom, infant latching and nursing well--baby has been gassy and spitty. Discussed laxative properties of colostrum and how to help relieve gas in infants. Mom verbalized understanding and no questions at this time.

## 2019-08-09 NOTE — PROGRESS NOTES
0725  Bedside and Verbal shift change report given to RONEL Jones, RN and Cleveland Bowles RN (oncoming nurse) by Geneva Bradford RN (offgoing nurse). Report included the following information SBAR, Kardex, Intake/Output, MAR and Recent Results. 0900  Rounded on patient and completed assessment. Patient declined scheduled colace. No further needs at this time. 1000  Rounded on patient, removed dressing, completed discharge teaching. Patient ready to be discharged.

## 2019-08-10 NOTE — DISCHARGE SUMMARY
[address]    Name:  Carla Poster  MR#:   [id number]  :  1980  ACCOUNT #:  [de-identified]  ADMIT DATE:  2019  DISCHARGE DATE:  2019    TIME:  12:30 p.m. ADMISSION DIAGNOSES:  Intrauterine pregnancy at term for repeat  section, O negative blood type with RhoGAM prophylaxis, rubella immune and class 1 Pap smears, for repeat  section at 39-1/2 weeks and advanced maternal age. DISCHARGE DIAGNOSES:  Advanced maternal age. Discharge diagnoses also includes repeat  section delivery, intrauterine pregnancy. COMPLICATIONS:  None. TRANSFUSIONS:  None. PATHOLOGY:  Pending on placenta. MEDICATION AND ADMINISTRATION PROPHYLAXIS:  RhoGAM was given on 2019, baby is blood type O positive. ADMISSION HISTORY AND PHYSICAL:  Dictated on 2019 was [phone] dictated at 1 p.m. that constitutes the admission history and physical.    LABORATORY DATA:  Not included in that dictation is as follows;  H and H are 36.7 and 11.9, white count of 5600, and platelet count of 008,789, O negative blood type, indirect Azeb positive for RhoGAM having been given per past medical history. Urinalysis is normal, nonreactive VDRL, and pregnancy-induced hypertension panel normal.    HOSPITAL COURSE:  The patient was taken to the operative suite on 2019 at approximately 9 a.m. and delivered with an Apgar of 9 and 9, 7-pound, 13-ounce female infant with meconium fluid and one nuchal cord with Dr. Valentino Lobstein of Neonatology in attendance. Pathology was submitted, it was placenta. The patient was on Ancef prophylaxis. During the course of the operation, the patient had good results from the spinal Duramorph. As we got to the fascia, there was discomfort and the patient I understand was given a dose of ketamine and then fentanyl postoperatively or Versed postoperatively after the baby was delivered.   During the course of the additional analgesics, the patient did very well with the pain management. Estimated blood loss was about 700 mL. Postoperatively, the patient did well. The patient was started on a Dilaudid PCA as recommended by Anesthesia  recovering with pain management and then later, the background was discontinued and the patient did well on the Dilaudid and eventually discontinued the Dilaudid PCA pump the following morning. The following morning, the H and H were 30.6 and 10.1 and white count of 8400, platelet count of 489,423. Baby's blood type returned as O positive, and the patient was given prophylactic RhoGAM on 08/08/2019. Thereafter, the patient did well. IVs were discontinued. The patient was maintained on periodic use of the Motrin, Tylenol and/or Percocet without difficulty. The patient ambulating well without difficulty. Normal GI and  functions. Uterus is now just basically palpable, nontender, incisions intact and clean. Extremities were normal.  It should be mentioned, the patient was on pneumatic stockings and vascular hose during the hospitalization because of a recent history of slight ache to her left leg which was worked up by vascular studies two days before her operation as being without a DVT. The patient had no symptomatology to the leg during her hospitalization. The patient discharged today, normal GI and  functions, ambulating well, requesting discharge. He is home on the home Percocet 5/325 one or two p.o. q.4 h. p.r.n. to be using f and c for birth control, spouse is going to get a vasectomy and continue her prenatal vitamins as is breast feeding. Pathology on placenta is pending.       Radha Ocampo MD      [id number]  D:  08/09/2019 12:40  T:  08/09/2019 20:42  JOB #:  [id number]

## (undated) DEVICE — SUT CHRMC 1 36IN CT1 BRN --

## (undated) DEVICE — REM POLYHESIVE ADULT PATIENT RETURN ELECTRODE: Brand: VALLEYLAB

## (undated) DEVICE — SUT VCRL + 2-0 36IN CT1 UD --

## (undated) DEVICE — SUT CHRMC 3-0 36IN V34 BRN --

## (undated) DEVICE — SUTURE CHROMIC GUT SZ 2-0 L36IN ABSRB BRN L36MM CT-1 1/2 923H

## (undated) DEVICE — SUT VCRL + 1 36IN CT1 VIO --

## (undated) DEVICE — 3L THIN WALL CAN: Brand: CRD

## (undated) DEVICE — MUCUS TRAP WITH VACUUM BREAKER AND FILTER,10 FR/CH (3.33 MM), CATHETER: Brand: ARGYLE

## (undated) DEVICE — PACK PROCEDURE SURG BIRTH

## (undated) DEVICE — SUTURE MCRYL SZ 3-0 L27IN ABSRB UD L24MM PS-1 3/8 CIR PRIM Y936H

## (undated) DEVICE — INTENDED FOR TISSUE SEPARATION, AND OTHER PROCEDURES THAT REQUIRE A SHARP SURGICAL BLADE TO PUNCTURE OR CUT.: Brand: BARD-PARKER ® CARBON RIB-BACK BLADES

## (undated) DEVICE — STRAP,POSITIONING,KNEE/BODY,FOAM,4X60": Brand: MEDLINE

## (undated) DEVICE — GARMENT,MEDLINE,DVT,INT,CALF,MED, GEN2: Brand: MEDLINE